# Patient Record
Sex: MALE | Race: WHITE | HISPANIC OR LATINO | Employment: UNEMPLOYED | URBAN - METROPOLITAN AREA
[De-identification: names, ages, dates, MRNs, and addresses within clinical notes are randomized per-mention and may not be internally consistent; named-entity substitution may affect disease eponyms.]

---
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
## 2021-04-29 ENCOUNTER — OFFICE VISIT (OUTPATIENT)
Dept: FAMILY MEDICINE CLINIC | Facility: CLINIC | Age: 21
End: 2021-04-29
Payer: COMMERCIAL

## 2021-04-29 VITALS
WEIGHT: 167 LBS | DIASTOLIC BLOOD PRESSURE: 60 MMHG | TEMPERATURE: 98 F | BODY MASS INDEX: 24.73 KG/M2 | HEIGHT: 69 IN | OXYGEN SATURATION: 98 % | SYSTOLIC BLOOD PRESSURE: 90 MMHG | HEART RATE: 80 BPM | RESPIRATION RATE: 16 BRPM

## 2021-04-29 DIAGNOSIS — D17.79 LIPOMA OF OTHER SPECIFIED SITES: Primary | ICD-10-CM

## 2021-04-29 DIAGNOSIS — Z23 NEED FOR HEPATITIS A VACCINATION: ICD-10-CM

## 2021-04-29 PROBLEM — D17.9 LIPOMA: Status: ACTIVE | Noted: 2021-04-29

## 2021-04-29 PROCEDURE — 90633 HEPA VACC PED/ADOL 2 DOSE IM: CPT

## 2021-04-29 PROCEDURE — 90471 IMMUNIZATION ADMIN: CPT

## 2021-04-29 PROCEDURE — 99203 OFFICE O/P NEW LOW 30 MIN: CPT | Performed by: PHYSICIAN ASSISTANT

## 2021-04-29 NOTE — PROGRESS NOTES
Assessment/Plan:    Lipoma  Large lipoma on R shoulder  Sherin reports it has been present for about 3 years, but it has been growing and is now impeding the ROM of his R shoulder  No erythema,     General surgery referral given  Diagnoses and all orders for this visit:    Lipoma of other specified sites  -     Ambulatory referral to General Surgery; Future    Need for hepatitis A vaccination  -     HEPATITIS A VACCINE PEDIATRIC / ADOLESCENT 2 DOSE IM          Subjective:      Patient ID: Moi Kong is a 24 y o  male  [facility] presents to the office to establish care  He is complaining of R shoulder mass that has been present for about 3 years  Medical problems: none    Surgical history: none    Family history: none    Social history:  Smoking: never  Alcohol use: never  Illicit/recreational drug use: never    Diet: healthy for the most part    Exercise: plays baseball; practice 3x/week    Sexual history: sexually active, women, 3 lifetime sexual partners, 1 current partner, no concern for or history of STIs    Occupation: student and works in [employer]        The following portions of the patient's history were reviewed and updated as appropriate: allergies, current medications, past family history, past medical history, past social history, past surgical history and problem list     Review of Systems   Constitutional: Negative for appetite change, chills, fatigue and fever  HENT: Negative for congestion, rhinorrhea, sinus pressure, sinus pain, sore throat and trouble swallowing  Eyes: Negative for visual disturbance  Respiratory: Negative for cough, choking and shortness of breath  Cardiovascular: Negative for chest pain and palpitations  Gastrointestinal: Negative for abdominal pain, constipation, diarrhea, nausea and vomiting  Genitourinary: Negative for discharge and dysuria  Musculoskeletal: Negative for arthralgias, gait problem and myalgias     Neurological: Negative for dizziness, tremors, weakness, light-headedness, numbness and headaches  Psychiatric/Behavioral: Negative for sleep disturbance  Objective:      BP 90/60 (BP Location: Left arm, Patient Position: Sitting, Cuff Size: Standard)   Pulse 80   Temp 98 °F (36 7 °C) (Temporal)   Resp 16   Ht 5' 9" (1 753 m)   Wt 75 8 kg (167 lb)   SpO2 98%   BMI 24 66 kg/m²          Physical Exam  Vitals signs reviewed  Constitutional:       General: He is not in acute distress  Appearance: Normal appearance  He is normal weight  He is not toxic-appearing  HENT:      Head: Normocephalic and atraumatic  Eyes:      Extraocular Movements: Extraocular movements intact  Conjunctiva/sclera: Conjunctivae normal    Neck:      Musculoskeletal: Normal range of motion  Cardiovascular:      Rate and Rhythm: Normal rate and regular rhythm  Pulses: Normal pulses  Heart sounds: Normal heart sounds  No murmur  Pulmonary:      Effort: Pulmonary effort is normal  No respiratory distress  Breath sounds: Normal breath sounds  No stridor  No wheezing, rhonchi or rales  Musculoskeletal:      Right shoulder: He exhibits decreased range of motion (2/2 mass)  He exhibits no tenderness, no swelling, no pain, normal pulse and normal strength  Arms:    Skin:     General: Skin is warm and dry  Neurological:      Mental Status: He is alert and oriented to person, place, and time  Sensory: No sensory deficit  Motor: No weakness  Psychiatric:         Mood and Affect: Mood normal          Behavior: Behavior normal          Thought Content: Thought content normal            BMI Counseling: Body mass index is 24 66 kg/m²   The BMI is normal

## 2021-04-29 NOTE — ASSESSMENT & PLAN NOTE
Large lipoma on R shoulder  Sherin reports it has been present for about 3 years, but it has been growing and is now impeding the ROM of his R shoulder  No erythema,     General surgery referral given

## 2021-05-12 ENCOUNTER — CONSULT (OUTPATIENT)
Dept: SURGERY | Facility: CLINIC | Age: 21
End: 2021-05-12
Payer: COMMERCIAL

## 2021-05-12 VITALS
WEIGHT: 171 LBS | HEIGHT: 69 IN | TEMPERATURE: 97.2 F | DIASTOLIC BLOOD PRESSURE: 68 MMHG | SYSTOLIC BLOOD PRESSURE: 104 MMHG | BODY MASS INDEX: 25.33 KG/M2 | HEART RATE: 75 BPM

## 2021-05-12 DIAGNOSIS — D17.1 LIPOMA OF BACK: Primary | ICD-10-CM

## 2021-05-12 DIAGNOSIS — D17.79 LIPOMA OF OTHER SPECIFIED SITES: ICD-10-CM

## 2021-05-12 PROCEDURE — 3008F BODY MASS INDEX DOCD: CPT | Performed by: SPECIALIST

## 2021-05-12 PROCEDURE — 99204 OFFICE O/P NEW MOD 45 MIN: CPT | Performed by: SPECIALIST

## 2021-05-12 PROCEDURE — 1036F TOBACCO NON-USER: CPT | Performed by: SPECIALIST

## 2021-05-12 NOTE — H&P
Chief Complaint:  Lipoma right scapula      History of Present Illness:  Patient is a 67-year-old  male the nephew of a previous patient of ours who presents to the office today with a large mass on the right scapula  He says he has had for about 2 years and appears to be getting larger  It is also starting to bother him  He says it is soft and was felt to be a "fatty tumor and of "  He denies any other masses  He is otherwise quite healthy  Past Medical History: History reviewed  No pertinent past medical history  Past Surgical History:  History reviewed  No pertinent surgical history  Allergies:  No Known Allergies      Medications:  No current outpatient medications on file  Social History:  Social History     Social History     Substance and Sexual Activity   Alcohol Use No     Social History     Substance and Sexual Activity   Drug Use No     Social History     Tobacco Use   Smoking Status Never Smoker   Smokeless Tobacco Never Used         Family History:    Family History   Problem Relation Age of Onset    No Known Problems Mother     No Known Problems Father          Review of Systems:      No weight loss weight gain fever chills night sweats chest pain nausea vomiting diarrhea constipation shortness of breath headaches sore throat blurry vision double vision chronic cough dysuria hematuria  [employer]  All other review of systems are negative  Vitals:  Vitals:    05/12/21 0935   BP: 104/68   Pulse: 75   Temp: (!) 97 2 °F (36 2 °C)       Physical Exam:    Adolescent  male 5 ft 9 in 171 lb who is awake alert no distress  Vital signs as above  Skin warm dry  Head normocephalic and atraumatic  Eyes RICHELLE a m  intact  Ears and nose within normal limits  Throat gag reflex intact  Neck no masses thyromegaly lymphadenopathy palpable  Back there is a visible mass near the angle of the right scapula  It is soft freely movable and nontender    It measures 9 cm in transverse diameter  No CVA or spinal tenderness  Lungs clear to a and P   Cor regular rate and rhythm no murmurs carotid bruits  Abdomen flat firm nontender no masses noted  No obvious hernias are noted  Extremities negative CC E   Neurologically A&O x3 cranial nerves 2-12 intact  Lymphatics no lymphadenopathy palpable  Lab Results: I have personally reviewed pertinent reports  See below  Imaging: I have personally reviewed pertinent reports  EKG, Pathology, and Other Studies: I have personally reviewed pertinent reports  No visits with results within 1 Day(s) from this visit  Latest known visit with results is:   Office Visit on 04/22/2020   Component Date Value    N gonorrhoeae, DNA Probe 04/22/2020 Negative     Chlamydia trachomatis, D* 04/22/2020 Negative          Impression:    Mass right scapular area  Lipoma  Plan: At this point was scheduled to have this surgically removed in the operating room under local anesthesia with IV sedation at the earliest possible date

## 2021-05-12 NOTE — LETTER
May 12, 2021     AMARA [id number]  [id number] [facility]    Patient: Lucas Polanco   YOB: 2000   Date of Visit: 5/12/2021       Dear  Ms Garduno    Thank you for referring Lucas Polanco to me for evaluation  Below are the relevant portions of my H&P  If you have questions, please do not hesitate to call me  I look forward to following Sherin along with you  Sincerely,     Jeana Rocha MD        CC: No Recipients  Shyam Hobbs MD  5/12/2021  2:30 PM  Signed  Chief Complaint:  Lipoma right scapula      History of Present Illness:  Patient is a 52-year-old  male the nephew of a previous patient of ours who presents to the office today with a large mass on the right scapula  He says he has had for about 2 years and appears to be getting larger  It is also starting to bother him  He says it is soft and was felt to be a "fatty tumor and of "  He denies any other masses  He is otherwise quite healthy  Past Medical History: History reviewed  No pertinent past medical history  Past Surgical History:  History reviewed  No pertinent surgical history  Allergies:  No Known Allergies      Medications:  No current outpatient medications on file  Social History:  Social History     Social History     Substance and Sexual Activity   Alcohol Use No     Social History     Substance and Sexual Activity   Drug Use No     Social History     Tobacco Use   Smoking Status Never Smoker   Smokeless Tobacco Never Used         Family History:    Family History   Problem Relation Age of Onset    No Known Problems Mother     No Known Problems Father          Review of Systems:      No weight loss weight gain fever chills night sweats chest pain nausea vomiting diarrhea constipation shortness of breath headaches sore throat blurry vision double vision chronic cough dysuria hematuria  [employer]    All other review of systems are negative  Vitals:  Vitals:    05/12/21 0935   BP: 104/68   Pulse: 75   Temp: (!) 97 2 °F (36 2 °C)       Physical Exam:    Adolescent  male 5 ft 9 in 171 lb who is awake alert no distress  Vital signs as above  Skin warm dry  Head normocephalic and atraumatic  Eyes RICHELLE a m  intact  Ears and nose within normal limits  Throat gag reflex intact  Neck no masses thyromegaly lymphadenopathy palpable  Back there is a visible mass near the angle of the right scapula  It is soft freely movable and nontender  It measures 9 cm in transverse diameter  No CVA or spinal tenderness  Lungs clear to a and P   Cor regular rate and rhythm no murmurs carotid bruits  Abdomen flat firm nontender no masses noted  No obvious hernias are noted  Extremities negative CC E   Neurologically A&O x3 cranial nerves 2-12 intact  Lymphatics no lymphadenopathy palpable  Lab Results: I have personally reviewed pertinent reports  See below  Imaging: I have personally reviewed pertinent reports  EKG, Pathology, and Other Studies: I have personally reviewed pertinent reports  No visits with results within 1 Day(s) from this visit  Latest known visit with results is:   Office Visit on 04/22/2020   Component Date Value    N gonorrhoeae, DNA Probe 04/22/2020 Negative     Chlamydia trachomatis, D* 04/22/2020 Negative          Impression:    Mass right scapular area  Lipoma  Plan: At this point was scheduled to have this surgically removed in the operating room under local anesthesia with IV sedation at the earliest possible date

## 2021-05-12 NOTE — H&P (VIEW-ONLY)
Chief Complaint:  Lipoma right scapula      History of Present Illness:  Patient is a 80-year-old  male the nephew of a previous patient of ours who presents to the office today with a large mass on the right scapula  He says he has had for about 2 years and appears to be getting larger  It is also starting to bother him  He says it is soft and was felt to be a "fatty tumor and of "  He denies any other masses  He is otherwise quite healthy  Past Medical History: History reviewed  No pertinent past medical history  Past Surgical History:  History reviewed  No pertinent surgical history  Allergies:  No Known Allergies      Medications:  No current outpatient medications on file  Social History:  Social History     Social History     Substance and Sexual Activity   Alcohol Use No     Social History     Substance and Sexual Activity   Drug Use No     Social History     Tobacco Use   Smoking Status Never Smoker   Smokeless Tobacco Never Used         Family History:    Family History   Problem Relation Age of Onset    No Known Problems Mother     No Known Problems Father          Review of Systems:      No weight loss weight gain fever chills night sweats chest pain nausea vomiting diarrhea constipation shortness of breath headaches sore throat blurry vision double vision chronic cough dysuria hematuria  [employer]  All other review of systems are negative  Vitals:  Vitals:    05/12/21 0935   BP: 104/68   Pulse: 75   Temp: (!) 97 2 °F (36 2 °C)       Physical Exam:    Adolescent  male 5 ft 9 in 171 lb who is awake alert no distress  Vital signs as above  Skin warm dry  Head normocephalic and atraumatic  Eyes RICHELLE a m  intact  Ears and nose within normal limits  Throat gag reflex intact  Neck no masses thyromegaly lymphadenopathy palpable  Back there is a visible mass near the angle of the right scapula  It is soft freely movable and nontender    It measures 9 cm in transverse diameter  No CVA or spinal tenderness  Lungs clear to a and P   Cor regular rate and rhythm no murmurs carotid bruits  Abdomen flat firm nontender no masses noted  No obvious hernias are noted  Extremities negative CC E   Neurologically A&O x3 cranial nerves 2-12 intact  Lymphatics no lymphadenopathy palpable  Lab Results: I have personally reviewed pertinent reports  See below  Imaging: I have personally reviewed pertinent reports  EKG, Pathology, and Other Studies: I have personally reviewed pertinent reports  No visits with results within 1 Day(s) from this visit  Latest known visit with results is:   Office Visit on 04/22/2020   Component Date Value    N gonorrhoeae, DNA Probe 04/22/2020 Negative     Chlamydia trachomatis, D* 04/22/2020 Negative          Impression:    Mass right scapular area  Lipoma  Plan: At this point was scheduled to have this surgically removed in the operating room under local anesthesia with IV sedation at the earliest possible date

## 2021-05-14 NOTE — PRE-PROCEDURE INSTRUCTIONS
No outpatient medications have been marked as taking for the 5/21/21 encounter [facility] Encounter)  5/14/21 Pt states is not on any medications at this time  Reviewed all medications and instructions for DOS  Reviewed all showering instructions and COVID visitation policy  Pt aware that [facility] is location for DOS, instructed that pt pre op nurse will call on 5/20/21 to give specific instructions for DOS  Pt instructed to bring photo ID and insurance card for DOS, remove all jewelry and  NO valuables for DOS  Pt instructed to use only Tylenol between now 5/14/21 and DOS, NO NSAID products  Pt informed transport is needed for DOS due to receiving anesthesia  Instructed patient to minimize alcohol use prior to surgery  No alcohol 24 hours prior to surgery to reduce risk of dehydration  Pt verbalized understanding of all instructions given and reviewed for DOS  My Surgical Experience    The following information was developed to assist you to prepare for your operation  What do I need to do before coming to the hospital?   Arrange for a responsible person to drive you to and from the hospital    Arrange care for your children at home  Children are not allowed in the recovery areas of the hospital   Plan to wear clothing that is easy to put on and take off  If you are having shoulder surgery, wear a shirt that buttons or zippers in the front  Bathing  o Shower the evening before and the morning of your surgery with an antibacterial soap  Please refer to the Pre Op Showering Instructions for Surgery Patients Sheet   o Remove nail polish and all body piercing jewelry  o Do not shave any body part for at least 24 hours before surgery-this includes face, arms, legs and upper body  Food  o Nothing to eat or drink after midnight the night before your surgery   This includes candy and chewing gum  o Exception: If your surgery is after 12:00pm (noon), you may have clear liquids such as 7-Up®, ginger ale, apple or cranberry juice, Jell-O®, water, or clear broth until 8:00 am  o Do not drink milk or juice with pulp on the morning before surgery  o Do not drink alcohol 24 hours before surgery  Medicine  o Follow instructions you received from your surgeon about which medicines you may take on the day of surgery  o If instructed to take medicine on the morning of surgery, take pills with just a small sip of water  Call your prescribing doctor for specific infroamtion on what to do if you take insulin    What should I bring to the hospital?    Bring:  Maricel [place] or a walker, if you have them, for foot or knee surgery   A list of the daily medicines, vitamins, minerals, herbals and nutritional supplements you take  Include the dosages of medicines and the time you take them each day   Glasses, dentures or hearing aids   Minimal clothing; you will be wearing hospital sleepwear   Photo ID; required to verify your identity   If you have a Living Will or Power of , bring a copy of the documents   If you have an ostomy, bring an extra pouch and any supplies you use    Do not bring   Medicines or inhalers   Money, valuables or jewelry    What other information should I know about the day of surgery?  Notify your surgeons if you develop a cold, sore throat, cough, fever, rash or any other illness   Report to the Ambulatory Surgical/Same Day Surgery Unit   You will be instructed to stop at Registration only if you have not been pre-registered   Inform your  fi they do not stay that they will be asked by the staff to leave a phone number where they can be reached   Be available to be reached before surgery   In the event the operating room schedule changes, you may be asked to come in earlier or later than expected    *It is important to tell your doctor and others involved in your health care if you are taking or have been taking any non-prescription drugs, vitamins, minerals, herbals or other nutritional supplements   Any of these may interact with some food or medicines and cause a reaction

## 2021-05-19 ENCOUNTER — TELEPHONE (OUTPATIENT)
Dept: SURGERY | Facility: CLINIC | Age: 21
End: 2021-05-19

## 2021-05-19 NOTE — TELEPHONE ENCOUNTER
Called patient for post visit phone call and pt stated he had to leave work yesterday and could not work today because of pain from mass in his back    Asked for excuse from work and per [place] Lauren [facility]

## 2021-05-20 ENCOUNTER — ANESTHESIA EVENT (OUTPATIENT)
Dept: PERIOP | Facility: HOSPITAL | Age: 21
End: 2021-05-20
Payer: COMMERCIAL

## 2021-05-20 NOTE — ANESTHESIA PREPROCEDURE EVALUATION
Procedure:  EXCISION  BIOPSY LESION/MASS BACK (Right Back)    Relevant Problems   ANESTHESIA (within normal limits)      CARDIO (within normal limits)      ENDO (within normal limits)      GI/HEPATIC (within normal limits)      /RENAL (within normal limits)      HEMATOLOGY (within normal limits)      MUSCULOSKELETAL (within normal limits)  nonpainful scapular mass      NEURO/PSYCH (within normal limits)      PULMONARY (within normal limits)        Physical Exam    Airway    Mallampati score: II  TM Distance: >3 FB  Neck ROM: full     Dental       Cardiovascular  Cardiovascular exam normal    Pulmonary  Pulmonary exam normal     Other Findings  Fixed upper and lower teeth and in good repair      Anesthesia Plan  ASA Score- 1     Anesthesia Type- IV sedation with anesthesia with ASA Monitors  Additional Monitors:   Airway Plan:           Plan Factors-Exercise tolerance (METS): >4 METS  Chart reviewed  Imaging results reviewed  Existing labs reviewed  Patient summary reviewed  Patient is not a current smoker  Patient not instructed to abstain from smoking on day of procedure  Patient did not smoke on day of surgery  Obstructive sleep apnea risk education given perioperatively  Induction- intravenous  Postoperative Plan- Plan for postoperative opioid use  Informed Consent- Anesthetic plan and risks discussed with patient and mother  I personally reviewed this patient with the CRNA  Discussed and agreed on the Anesthesia Plan with the CRNA  Leslie Cesar

## 2021-05-21 ENCOUNTER — HOSPITAL ENCOUNTER (OUTPATIENT)
Facility: HOSPITAL | Age: 21
Setting detail: OUTPATIENT SURGERY
Discharge: HOME | End: 2021-05-21
Attending: SPECIALIST | Admitting: SPECIALIST
Payer: COMMERCIAL

## 2021-05-21 ENCOUNTER — ANESTHESIA (OUTPATIENT)
Dept: PERIOP | Facility: HOSPITAL | Age: 21
End: 2021-05-21
Payer: COMMERCIAL

## 2021-05-21 VITALS
TEMPERATURE: 96.7 F | SYSTOLIC BLOOD PRESSURE: 102 MMHG | HEART RATE: 69 BPM | OXYGEN SATURATION: 96 % | BODY MASS INDEX: 24.48 KG/M2 | HEIGHT: 70 IN | RESPIRATION RATE: 16 BRPM | DIASTOLIC BLOOD PRESSURE: 60 MMHG | WEIGHT: 171 LBS

## 2021-05-21 DIAGNOSIS — D17.79 LIPOMA OF OTHER SPECIFIED SITES: ICD-10-CM

## 2021-05-21 DIAGNOSIS — D17.1 LIPOMA OF TORSO: Primary | ICD-10-CM

## 2021-05-21 PROCEDURE — 88304 TISSUE EXAM BY PATHOLOGIST: CPT | Performed by: PATHOLOGY

## 2021-05-21 PROCEDURE — 23071 EXC SHOULDER LES SC 3 CM/>: CPT | Performed by: SPECIALIST

## 2021-05-21 RX ORDER — PROPOFOL 10 MG/ML
INJECTION, EMULSION INTRAVENOUS AS NEEDED
Status: DISCONTINUED | OUTPATIENT
Start: 2021-05-21 | End: 2021-05-21

## 2021-05-21 RX ORDER — FENTANYL CITRATE 50 UG/ML
INJECTION, SOLUTION INTRAMUSCULAR; INTRAVENOUS AS NEEDED
Status: DISCONTINUED | OUTPATIENT
Start: 2021-05-21 | End: 2021-05-21

## 2021-05-21 RX ORDER — SODIUM CHLORIDE, SODIUM LACTATE, POTASSIUM CHLORIDE, CALCIUM CHLORIDE 600; 310; 30; 20 MG/100ML; MG/100ML; MG/100ML; MG/100ML
75 INJECTION, SOLUTION INTRAVENOUS CONTINUOUS
Status: DISCONTINUED | OUTPATIENT
Start: 2021-05-21 | End: 2021-05-21 | Stop reason: HOSPADM

## 2021-05-21 RX ORDER — OXYCODONE HYDROCHLORIDE AND ACETAMINOPHEN 5; 325 MG/1; MG/1
1 TABLET ORAL EVERY 4 HOURS PRN
Status: DISCONTINUED | OUTPATIENT
Start: 2021-05-21 | End: 2021-05-21 | Stop reason: HOSPADM

## 2021-05-21 RX ORDER — PROMETHAZINE HYDROCHLORIDE 25 MG/ML
12.5 INJECTION, SOLUTION INTRAMUSCULAR; INTRAVENOUS ONCE AS NEEDED
Status: DISCONTINUED | OUTPATIENT
Start: 2021-05-21 | End: 2021-05-21 | Stop reason: HOSPADM

## 2021-05-21 RX ORDER — MIDAZOLAM HYDROCHLORIDE 2 MG/2ML
INJECTION, SOLUTION INTRAMUSCULAR; INTRAVENOUS AS NEEDED
Status: DISCONTINUED | OUTPATIENT
Start: 2021-05-21 | End: 2021-05-21

## 2021-05-21 RX ORDER — MEPERIDINE HYDROCHLORIDE 25 MG/ML
12.5 INJECTION INTRAMUSCULAR; INTRAVENOUS; SUBCUTANEOUS
Status: DISCONTINUED | OUTPATIENT
Start: 2021-05-21 | End: 2021-05-21 | Stop reason: HOSPADM

## 2021-05-21 RX ORDER — FENTANYL CITRATE/PF 50 MCG/ML
12.5 SYRINGE (ML) INJECTION
Status: DISCONTINUED | OUTPATIENT
Start: 2021-05-21 | End: 2021-05-21 | Stop reason: HOSPADM

## 2021-05-21 RX ORDER — OXYCODONE HYDROCHLORIDE AND ACETAMINOPHEN 5; 325 MG/1; MG/1
2 TABLET ORAL EVERY 4 HOURS PRN
Status: DISCONTINUED | OUTPATIENT
Start: 2021-05-21 | End: 2021-05-21 | Stop reason: HOSPADM

## 2021-05-21 RX ORDER — OXYCODONE HYDROCHLORIDE AND ACETAMINOPHEN 5; 325 MG/1; MG/1
1 TABLET ORAL EVERY 6 HOURS PRN
Qty: 10 TABLET | Refills: 0 | Status: SHIPPED | OUTPATIENT
Start: 2021-05-21 | End: 2021-05-31

## 2021-05-21 RX ORDER — FENTANYL CITRATE/PF 50 MCG/ML
25 SYRINGE (ML) INJECTION
Status: DISCONTINUED | OUTPATIENT
Start: 2021-05-21 | End: 2021-05-21 | Stop reason: HOSPADM

## 2021-05-21 RX ORDER — LIDOCAINE HYDROCHLORIDE AND EPINEPHRINE 10; 10 MG/ML; UG/ML
INJECTION, SOLUTION INFILTRATION; PERINEURAL AS NEEDED
Status: DISCONTINUED | OUTPATIENT
Start: 2021-05-21 | End: 2021-05-21 | Stop reason: HOSPADM

## 2021-05-21 RX ORDER — CEFAZOLIN SODIUM 2 G/50ML
2000 SOLUTION INTRAVENOUS ONCE
Status: COMPLETED | OUTPATIENT
Start: 2021-05-21 | End: 2021-05-21

## 2021-05-21 RX ORDER — PROPOFOL 10 MG/ML
INJECTION, EMULSION INTRAVENOUS CONTINUOUS PRN
Status: DISCONTINUED | OUTPATIENT
Start: 2021-05-21 | End: 2021-05-21

## 2021-05-21 RX ORDER — DIPHENHYDRAMINE HYDROCHLORIDE 50 MG/ML
12.5 INJECTION INTRAMUSCULAR; INTRAVENOUS ONCE AS NEEDED
Status: DISCONTINUED | OUTPATIENT
Start: 2021-05-21 | End: 2021-05-21 | Stop reason: HOSPADM

## 2021-05-21 RX ORDER — DEXAMETHASONE SODIUM PHOSPHATE 4 MG/ML
4 INJECTION, SOLUTION INTRA-ARTICULAR; INTRALESIONAL; INTRAMUSCULAR; INTRAVENOUS; SOFT TISSUE ONCE AS NEEDED
Status: DISCONTINUED | OUTPATIENT
Start: 2021-05-21 | End: 2021-05-21 | Stop reason: HOSPADM

## 2021-05-21 RX ORDER — ONDANSETRON 2 MG/ML
4 INJECTION INTRAMUSCULAR; INTRAVENOUS EVERY 8 HOURS PRN
Status: DISCONTINUED | OUTPATIENT
Start: 2021-05-21 | End: 2021-05-21 | Stop reason: HOSPADM

## 2021-05-21 RX ADMIN — FENTANYL CITRATE 100 MCG: 50 INJECTION, SOLUTION INTRAMUSCULAR; INTRAVENOUS at 11:34

## 2021-05-21 RX ADMIN — MIDAZOLAM 2 MG: 1 INJECTION INTRAMUSCULAR; INTRAVENOUS at 11:31

## 2021-05-21 RX ADMIN — CEFAZOLIN SODIUM 2000 MG: 2 SOLUTION INTRAVENOUS at 11:25

## 2021-05-21 RX ADMIN — PROPOFOL 70 MG: 10 INJECTION, EMULSION INTRAVENOUS at 11:38

## 2021-05-21 RX ADMIN — PHENYLEPHRINE HYDROCHLORIDE 100 MCG: 10 INJECTION INTRAVENOUS at 12:09

## 2021-05-21 RX ADMIN — SODIUM CHLORIDE, SODIUM LACTATE, POTASSIUM CHLORIDE, AND CALCIUM CHLORIDE: .6; .31; .03; .02 INJECTION, SOLUTION INTRAVENOUS at 11:31

## 2021-05-21 RX ADMIN — PROPOFOL 120 MCG/KG/MIN: 10 INJECTION, EMULSION INTRAVENOUS at 11:38

## 2021-05-21 NOTE — ANESTHESIA POSTPROCEDURE EVALUATION
Post-Op Assessment Note    CV Status:  Stable  Pain Score: 1    Pain management: adequate     Mental Status:  Alert and awake   Hydration Status:  Euvolemic   PONV Controlled:  Controlled   Airway Patency:  Patent      Post Op Vitals Reviewed: Yes      Staff: Anesthesiologist, CRNA         No complications documented      /57 (05/21/21 1234)    Temp 97 9 °F (36 6 °C) (05/21/21 1234)    Pulse 83 (05/21/21 1234)   Resp 12 (05/21/21 1234)    SpO2 99 % (05/21/21 1234)

## 2021-05-21 NOTE — NURSING NOTE
Pt is awake, alert,tolerated diet, OOB voided seen and instructed by Dr Obed Qiu, pt and family verbalize an understanding and voices no questions or complaints

## 2021-05-21 NOTE — ANESTHESIA POSTPROCEDURE EVALUATION
Post-Op Assessment Note    CV Status:  Stable  Pain Score: 0    Pain management: adequate     Mental Status:  Alert and awake   Hydration Status:  Euvolemic   PONV Controlled:  Controlled   Airway Patency:  Patent      Post Op Vitals Reviewed: Yes      Staff: Anesthesiologist, CRNA         No complications documented      BP (P) 125/57 (05/21/21 1234)    Temp (P) 97 9 °F (36 6 °C) (05/21/21 1234)    Pulse (P) 83 (05/21/21 1234)   Resp (P) 12 (05/21/21 1234)    SpO2 (P) 99 % (05/21/21 1234)

## 2021-05-21 NOTE — INTERVAL H&P NOTE
H&P reviewed  After examining the patient I find no changes in the patients condition since the H&P had been written      Vitals:    05/21/21 0930   BP: 105/62   Pulse: 61   Resp: 18   Temp: 97 5 °F (36 4 °C)   SpO2: 96%

## 2021-05-21 NOTE — OP NOTE
OPERATIVE REPORT  PATIENT NAME: Leoncio Tucker    :  2000  MRN: [id number]  Pt Location:  OR ROOM 07    SURGERY DATE: 2021    Surgeon(s) and Role:     Bobo Kendall MD - Primary    Preop Diagnosis:  Lipoma of other specified sites [D17 79] right scapular area    Post-Op Diagnosis Codes:     * Lipoma of other specified sites [D17 79] right scapular area  8 cm x 5 cm x 1 5 cm    Procedure(s) (LRB):  EXCISION  BIOPSY LESION/MASS BACK (Right)    Specimen(s):  ID Type Source Tests Collected by Time Destination   1 : LIPOMA  RIGHT SCAPULA Tissue Soft Tissue, Lipoma TISSUE EXAM Alcira Gaytan MD 2021 12:11 PM        Estimated Blood Loss:   Minimal    Drains:  * No LDAs found *    Anesthesia Type:   IV Sedation with Anesthesia    Operative Indications:  Lipoma of other specified sites [D17 79]  Painful    Operative Findings:  8 cm x 5 cm x 1 5 cm multi lobular lipoma    Thanks  Complications:   None    Procedure and Technique:  Patient brought the operating room postop table and a left lateral decubitus position  Palpable mass in the right scapular area was prepped and draped in usual sterile fashion  1% lidocaine was used to infiltrate the area  15  Scalpel blade was used to make an incision directly over the area  Carried down subcutaneous tissue using the Bovie  Bleeders cauterized at that time  The mass was deeper in the subcutaneous tissue but the mass was identified  The capsule of the mass was cut an obviously it appeared to be lipoma  The capsule was opened proximally and distally and the mass was actually larger than originally expected  It was carefully dissected free from the surrounding tissue taking care not to leave any residual mass  It appeared to be a multilobular lipoma that eventually was teased out of the subcutaneous tissue  It measured 8 cm x 5 cm x 1 5 cm  Was sent to pathology for study masses    There was inspected for bleeding bleeding was controlled electrocautery  After adequate hemostasis was obtained subcutaneous tissue was reapproximated 3 0 Vicryl interrupted fashion  Skin closed with 4 Monocryl in a running subcuticular fashion  Additional lidocaine was injected in the wound prior to closure  Benzoin Steri-Strips applied    The estimated blood loss minimal patient tolerated the procedure well delivered to recovery room stable condition   I was present for the entire procedure    Patient Disposition:  PACU     SIGNATURE: Petr Hayden MD  DATE: May 21, 2021  TIME: 12:56 PM

## 2021-06-08 ENCOUNTER — OFFICE VISIT (OUTPATIENT)
Dept: SURGERY | Facility: CLINIC | Age: 21
End: 2021-06-08

## 2021-06-08 VITALS — TEMPERATURE: 97.1 F

## 2021-06-08 DIAGNOSIS — D17.79 LIPOMA OF OTHER SPECIFIED SITES: Primary | ICD-10-CM

## 2021-06-08 PROCEDURE — 99024 POSTOP FOLLOW-UP VISIT: CPT | Performed by: SPECIALIST

## 2021-06-08 NOTE — PROGRESS NOTES
Tomas Campbell presents today for his follow-up visit status post excision of a lipoma from the right scapula  Today in the office says he is fine  He had minimal pain postoperatively  He says the wound is healing well with no drainage  Physical exam:  Sonia Starla adult  male awake alert no distress       Right scapula demonstrates a healing scar with no evidence of infection  He has excellent cosmetic result  Final pathology of course was  Benign  Impression:  Doing well status post excision of lipoma right scapula  Plan:  Discharge patient  Return here p r n  Give my best to your uncle Jason Jose

## (undated) DEVICE — SPONGE LAP 18 X 4 IN STRL RFD

## (undated) DEVICE — 3M™ STERI-STRIP™ REINFORCED ADHESIVE SKIN CLOSURES, R1547, 1/2 IN X 4 IN (12 MM X 100 MM), 6 STRIPS/ENVELOPE: Brand: 3M™ STERI-STRIP™

## (undated) DEVICE — LIGHT GLOVE GREEN

## (undated) DEVICE — SUT VICRYL 3-0 SH 27 IN J416H

## (undated) DEVICE — INTENDED FOR TISSUE SEPARATION, AND OTHER PROCEDURES THAT REQUIRE A SHARP SURGICAL BLADE TO PUNCTURE OR CUT.: Brand: BARD-PARKER SAFETY BLADES SIZE 15, STERILE

## (undated) DEVICE — SYRINGE 10ML LL CONTROL TOP

## (undated) DEVICE — NDL CNTR 20CT FM MAG: Brand: MEDLINE INDUSTRIES, INC.

## (undated) DEVICE — SUT MONOCRYL 4-0 PS-2 27 IN Y426H

## (undated) DEVICE — PENCIL ELECTROSURG E-Z CLEAN -0035H

## (undated) DEVICE — MINOR PROCEDURE DRAPE: Brand: CONVERTORS

## (undated) DEVICE — SKIN MARKER DUAL TIP WITH RULER CAP, FLEXIBLE RULER AND LABELS: Brand: DEVON

## (undated) DEVICE — BASIC PACK: Brand: CONVERTORS

## (undated) DEVICE — NEEDLE 25G X 1 1/2

## (undated) DEVICE — NEEDLE BLUNT 18 G X 1 1/2IN

## (undated) DEVICE — SURGICAL CLIPPER BLADE GENERAL USE

## (undated) DEVICE — SWABSTCK, BENZOIN TINCTURE, 1/PK, STRL: Brand: APLICARE

## (undated) DEVICE — STERILE POLYISOPRENE POWDER-FREE SURGICAL GLOVES: Brand: PROTEXIS

## (undated) DEVICE — CHLORAPREP HI-LITE 26ML ORANGE

## (undated) DEVICE — SYRINGE 30ML LL